# Patient Record
Sex: FEMALE | Race: WHITE | NOT HISPANIC OR LATINO | URBAN - METROPOLITAN AREA
[De-identification: names, ages, dates, MRNs, and addresses within clinical notes are randomized per-mention and may not be internally consistent; named-entity substitution may affect disease eponyms.]

---

## 2017-04-13 ENCOUNTER — APPOINTMENT (RX ONLY)
Dept: URBAN - METROPOLITAN AREA CLINIC 37 | Facility: CLINIC | Age: 57
Setting detail: DERMATOLOGY
End: 2017-04-13

## 2017-04-13 DIAGNOSIS — L29.8 OTHER PRURITUS: ICD-10-CM

## 2017-04-13 DIAGNOSIS — L82.1 OTHER SEBORRHEIC KERATOSIS: ICD-10-CM

## 2017-04-13 DIAGNOSIS — L29.89 OTHER PRURITUS: ICD-10-CM

## 2017-04-13 PROBLEM — J30.1 ALLERGIC RHINITIS DUE TO POLLEN: Status: ACTIVE | Noted: 2017-04-13

## 2017-04-13 PROBLEM — D48.5 NEOPLASM OF UNCERTAIN BEHAVIOR OF SKIN: Status: ACTIVE | Noted: 2017-04-13

## 2017-04-13 PROBLEM — L55.1 SUNBURN OF SECOND DEGREE: Status: ACTIVE | Noted: 2017-04-13

## 2017-04-13 PROBLEM — L29.9 PRURITUS, UNSPECIFIED: Status: ACTIVE | Noted: 2017-04-13

## 2017-04-13 PROBLEM — L85.3 XEROSIS CUTIS: Status: ACTIVE | Noted: 2017-04-13

## 2017-04-13 PROCEDURE — 11101: CPT

## 2017-04-13 PROCEDURE — ? COUNSELING

## 2017-04-13 PROCEDURE — ? PRESCRIPTION

## 2017-04-13 PROCEDURE — 99202 OFFICE O/P NEW SF 15 MIN: CPT | Mod: 25

## 2017-04-13 PROCEDURE — ? BIOPSY BY SHAVE METHOD

## 2017-04-13 PROCEDURE — 11100: CPT

## 2017-04-13 RX ORDER — CLOBETASOL PROPIONATE 0.5 MG/G
AEROSOL, FOAM TOPICAL
Qty: 1 | Refills: 2 | Status: ERX | COMMUNITY
Start: 2017-04-13

## 2017-04-13 RX ADMIN — CLOBETASOL PROPIONATE: 0.5 AEROSOL, FOAM TOPICAL at 14:42

## 2017-04-13 ASSESSMENT — LOCATION ZONE DERM
LOCATION ZONE: SCALP
LOCATION ZONE: FACE

## 2017-04-13 ASSESSMENT — LOCATION DETAILED DESCRIPTION DERM
LOCATION DETAILED: MID-OCCIPITAL SCALP
LOCATION DETAILED: RIGHT INFERIOR LATERAL FOREHEAD
LOCATION DETAILED: RIGHT FOREHEAD

## 2017-04-13 ASSESSMENT — LOCATION SIMPLE DESCRIPTION DERM
LOCATION SIMPLE: POSTERIOR SCALP
LOCATION SIMPLE: RIGHT FOREHEAD

## 2017-04-13 NOTE — PROCEDURE: BIOPSY BY SHAVE METHOD
Notification Instructions: Patient will be notified of biopsy results. However, patient instructed to call the office if not contacted within 2 weeks.
Render Post-Care Instructions In Note?: no
Silver Nitrate Text: The wound bed was treated with silver nitrate after the biopsy was performed.
X Size Of Lesion In Cm: 0
Lab: [id number]
Curettage Text: The wound bed was treated with curettage after the biopsy was performed.
Biopsy Method: Dermablade
Electrodesiccation And Curettage Text: The wound bed was treated with electrodesiccation and curettage after the biopsy was performed.
Type Of Destruction Used: Curettage
Billing Type: Third-Party Bill
Lab Facility: [id number]
Wound Care: Bacitracin
Consent: Verbal consent was obtained and risks were reviewed including but not limited to scarring, infection, bleeding, scabbing, incomplete removal, nerve damage and allergy to anesthesia.
Biopsy Type: H and E
Cryotherapy Text: The wound bed was treated with cryotherapy after the biopsy was performed.
Detail Level: Detailed
Dressing: bandage
Anesthesia Volume In Cc (Will Not Render If 0): 0.3
Electrodesiccation Text: The wound bed was treated with electrodesiccation after the biopsy was performed.
Post-Care Instructions: I reviewed with the patient in detail post-care instructions. Patient is to keep the biopsy site dry overnight, and then apply bacitracin twice daily until healed. Patient may apply hydrogen peroxide soaks to remove any crusting.
Hemostasis: Drysol
Anesthesia Type: 1% lidocaine without epinephrine

## 2017-04-13 NOTE — PROCEDURE: MIPS QUALITY
Quality 431: Preventive Care And Screening: Unhealthy Alcohol Use - Screening: Unhealthy alcohol use screening not performed, reason not otherwise specified
Detail Level: Detailed